# Patient Record
Sex: MALE | Race: BLACK OR AFRICAN AMERICAN
[De-identification: names, ages, dates, MRNs, and addresses within clinical notes are randomized per-mention and may not be internally consistent; named-entity substitution may affect disease eponyms.]

---

## 2018-01-29 ENCOUNTER — HOSPITAL ENCOUNTER (OUTPATIENT)
Dept: HOSPITAL 19 - COL.RAD | Age: 58
End: 2018-01-29
Attending: UROLOGY
Payer: COMMERCIAL

## 2018-01-29 DIAGNOSIS — M54.5: ICD-10-CM

## 2018-01-29 DIAGNOSIS — N28.89: ICD-10-CM

## 2018-01-29 DIAGNOSIS — Z87.442: ICD-10-CM

## 2018-01-29 DIAGNOSIS — N20.0: Primary | ICD-10-CM

## 2019-03-17 ENCOUNTER — HOSPITAL ENCOUNTER (EMERGENCY)
Dept: HOSPITAL 19 - COL.ER | Age: 59
Discharge: HOME | End: 2019-03-17
Payer: COMMERCIAL

## 2019-03-17 VITALS — TEMPERATURE: 98.4 F | HEART RATE: 75 BPM | SYSTOLIC BLOOD PRESSURE: 133 MMHG | DIASTOLIC BLOOD PRESSURE: 83 MMHG

## 2019-03-17 VITALS — HEIGHT: 70 IN | BODY MASS INDEX: 38.32 KG/M2 | WEIGHT: 267.64 LBS

## 2019-03-17 DIAGNOSIS — H16.002: Primary | ICD-10-CM

## 2019-03-17 DIAGNOSIS — Z79.82: ICD-10-CM

## 2019-11-08 ENCOUNTER — HOSPITAL ENCOUNTER (OUTPATIENT)
Dept: HOSPITAL 19 - SDCO | Age: 59
Discharge: HOME | End: 2019-11-08
Attending: SPECIALIST
Payer: COMMERCIAL

## 2019-11-08 VITALS — DIASTOLIC BLOOD PRESSURE: 65 MMHG | HEART RATE: 64 BPM | SYSTOLIC BLOOD PRESSURE: 88 MMHG | TEMPERATURE: 97 F

## 2019-11-08 VITALS — SYSTOLIC BLOOD PRESSURE: 90 MMHG | HEART RATE: 70 BPM | DIASTOLIC BLOOD PRESSURE: 66 MMHG

## 2019-11-08 VITALS — WEIGHT: 284.4 LBS | BODY MASS INDEX: 40.71 KG/M2 | HEIGHT: 70 IN

## 2019-11-08 VITALS — HEART RATE: 70 BPM | DIASTOLIC BLOOD PRESSURE: 66 MMHG | SYSTOLIC BLOOD PRESSURE: 95 MMHG

## 2019-11-08 VITALS — SYSTOLIC BLOOD PRESSURE: 90 MMHG | HEART RATE: 70 BPM | DIASTOLIC BLOOD PRESSURE: 67 MMHG

## 2019-11-08 VITALS — HEART RATE: 73 BPM | TEMPERATURE: 97.7 F | SYSTOLIC BLOOD PRESSURE: 121 MMHG | DIASTOLIC BLOOD PRESSURE: 81 MMHG

## 2019-11-08 DIAGNOSIS — G47.33: ICD-10-CM

## 2019-11-08 DIAGNOSIS — Z96.651: ICD-10-CM

## 2019-11-08 DIAGNOSIS — K22.8: ICD-10-CM

## 2019-11-08 DIAGNOSIS — K21.0: Primary | ICD-10-CM

## 2019-11-08 DIAGNOSIS — Z79.82: ICD-10-CM

## 2019-11-08 DIAGNOSIS — M19.90: ICD-10-CM

## 2019-11-08 DIAGNOSIS — I10: ICD-10-CM

## 2019-11-08 DIAGNOSIS — R15.9: ICD-10-CM

## 2019-11-08 DIAGNOSIS — E78.00: ICD-10-CM

## 2019-11-08 DIAGNOSIS — K64.1: ICD-10-CM

## 2019-11-08 DIAGNOSIS — K57.30: ICD-10-CM

## 2019-11-08 DIAGNOSIS — K92.1: ICD-10-CM

## 2019-11-08 NOTE — NUR
Pt sitting comfortably in chair with wife at bedside. Pt denies dizziness,
n/v. He is pink and cap refill less than 3 seconds. Pt is awake, alert, and
oriented. Per wife, Dr. Villaseñor came in to speak with her already about the
procedure while pt was in recovery post procedure. Wife relayed that
information to patient, and they have no further questions or concerns at this
time.

## 2019-11-08 NOTE — NUR
BP recheck. Pt continues to deny symptoms of decreased BP including dizziness,
lightheadedness, numbness/tingling. HR stable, cap refill less than 3 and
mucous membranes pink.

## 2019-11-08 NOTE — NUR
Reviewed discharge information with patient and wife including new medication
information (Omeprazole). Pt and wife had no further concerns/questions at
this time, and they expressed understanding of the plan. Pt states that he is
ready to go home and "eat a real meal." He denies pain, dizziness, nausea or
any symptoms of low BP. Pt meets criteria for discharge.

## 2019-11-08 NOTE — NUR
Pt arrived to room post procedure via cart with Endo RN.  Pt is alert, awake,
and oriented and easily ambulated to chair from cart.
Received report from
CHELSEY Tee, and she reported that pt experienced some low BP during procedure
and required some ephedrine to BP support. Pt's current BP is 90's over 60's,
but patient denies dizziness or lightheadedness, and his mucous membranes are
pink and cap refill is less than 3 seconds. Other vital signs are stable and
WNL on RA.
Apple juice and muffin brought to patient per his request. Pt denies pain or
nausea at this time.

## 2020-01-06 ENCOUNTER — HOSPITAL ENCOUNTER (OUTPATIENT)
Dept: HOSPITAL 19 - COL.RAD | Age: 60
End: 2020-01-06
Payer: OTHER GOVERNMENT

## 2020-01-06 DIAGNOSIS — M25.674: ICD-10-CM

## 2020-01-06 DIAGNOSIS — S99.921A: Primary | ICD-10-CM

## 2020-01-06 DIAGNOSIS — M72.2: ICD-10-CM

## 2020-11-19 ENCOUNTER — HOSPITAL ENCOUNTER (INPATIENT)
Dept: HOSPITAL 19 - JCC | Age: 60
LOS: 29 days | Discharge: HOME | DRG: 470 | End: 2020-12-18
Attending: ORTHOPAEDIC SURGERY | Admitting: ORTHOPAEDIC SURGERY
Payer: COMMERCIAL

## 2020-11-19 VITALS — WEIGHT: 264.78 LBS | BODY MASS INDEX: 37.91 KG/M2 | HEIGHT: 70 IN

## 2020-11-19 DIAGNOSIS — Z88.5: ICD-10-CM

## 2020-11-19 DIAGNOSIS — M17.12: Primary | ICD-10-CM

## 2020-11-19 DIAGNOSIS — R52: ICD-10-CM

## 2020-11-19 PROCEDURE — C1776 JOINT DEVICE (IMPLANTABLE): HCPCS

## 2020-11-19 PROCEDURE — A9284 NON-ELECTRONIC SPIROMETER: HCPCS

## 2020-12-16 VITALS — TEMPERATURE: 97.7 F | HEART RATE: 99 BPM | SYSTOLIC BLOOD PRESSURE: 116 MMHG | DIASTOLIC BLOOD PRESSURE: 74 MMHG

## 2020-12-16 VITALS — HEART RATE: 83 BPM | DIASTOLIC BLOOD PRESSURE: 58 MMHG | SYSTOLIC BLOOD PRESSURE: 113 MMHG

## 2020-12-16 VITALS — HEART RATE: 87 BPM | SYSTOLIC BLOOD PRESSURE: 109 MMHG | DIASTOLIC BLOOD PRESSURE: 64 MMHG

## 2020-12-16 VITALS — DIASTOLIC BLOOD PRESSURE: 82 MMHG | SYSTOLIC BLOOD PRESSURE: 142 MMHG | HEART RATE: 96 BPM

## 2020-12-16 VITALS — DIASTOLIC BLOOD PRESSURE: 69 MMHG | HEART RATE: 97 BPM | SYSTOLIC BLOOD PRESSURE: 116 MMHG | TEMPERATURE: 98.7 F

## 2020-12-16 VITALS — HEART RATE: 79 BPM | SYSTOLIC BLOOD PRESSURE: 119 MMHG | DIASTOLIC BLOOD PRESSURE: 76 MMHG | TEMPERATURE: 98.4 F

## 2020-12-16 VITALS — SYSTOLIC BLOOD PRESSURE: 106 MMHG | DIASTOLIC BLOOD PRESSURE: 64 MMHG | HEART RATE: 86 BPM

## 2020-12-16 VITALS — SYSTOLIC BLOOD PRESSURE: 105 MMHG | DIASTOLIC BLOOD PRESSURE: 60 MMHG | HEART RATE: 93 BPM

## 2020-12-16 VITALS — SYSTOLIC BLOOD PRESSURE: 110 MMHG | HEART RATE: 94 BPM | DIASTOLIC BLOOD PRESSURE: 72 MMHG

## 2020-12-16 VITALS — SYSTOLIC BLOOD PRESSURE: 108 MMHG | DIASTOLIC BLOOD PRESSURE: 82 MMHG | HEART RATE: 86 BPM

## 2020-12-16 VITALS — SYSTOLIC BLOOD PRESSURE: 91 MMHG | HEART RATE: 85 BPM | TEMPERATURE: 97.9 F | DIASTOLIC BLOOD PRESSURE: 53 MMHG

## 2020-12-16 VITALS — HEART RATE: 100 BPM | TEMPERATURE: 97.9 F | DIASTOLIC BLOOD PRESSURE: 71 MMHG | SYSTOLIC BLOOD PRESSURE: 111 MMHG

## 2020-12-16 PROCEDURE — 0SRD0J9 REPLACEMENT OF LEFT KNEE JOINT WITH SYNTHETIC SUBSTITUTE, CEMENTED, OPEN APPROACH: ICD-10-PCS | Performed by: ORTHOPAEDIC SURGERY

## 2020-12-16 NOTE — NUR
PATIENT DENIES PAIN. PATIENT REPORTING MORE SENSATION IN HIS LEFT LEG. POST-OP
VSS. PATIENT TOLERATING PO INTAKE WITHOUT DIFFICULTIES.

## 2020-12-16 NOTE — NUR
met with patient to discuss discharge planning. Patient lives in
[place] with his wife, Samantha (ph#[phone]). Patient sees Dr. Aguila at the [facility] and Dr. Carine Geller, both for primary care.
Patient [facility] most [facility] but also utilizes
[facility] in [place] as needed. Patient uses a CPAP at night and has a
walker at home. Patient is normally independent with ADLS and plans to return
home upon discharge. SW discussed Advance Directives with patient who was
interested in establishing DPOA-HC. Patient completed form and designated his
wife, Samantha and son, Steven. SW provided original and copies to patient
then placed copy in patient's chart. SW contacted patient's wife, Samantha to
review discharge plan. Samantha does not have any questions and concerns at
this time. SW will continue to follow as needed.

## 2020-12-16 NOTE — NUR
Arrived to surgical floor. Assessment complete and WNL. IV started in right
forearm 20g. Left knee surgical site scrubbed and marked. Consent signed and
on chart. All questions answered at this time. TORY applied to non surgical
limb. Call light in reach.

## 2020-12-16 NOTE — NUR
CNA ENTERED ROOM AND REPORTED TO THIS NURSE THAT THE PATIENT IS TEARFUL AND
APPEARS TO BE IN SEVERE PAIN. UPON ENTRY TO THE ROOM THE PATIENT IS TENSE,
SHIFTING IN BED, AND MOANING IN PAIN. PATIENT REPORTS THAT THE PAIN SPRUNG UP
OUT OF NOWHERE. PO NORCO AND IV MORPHINE GIVEN AT THIS TIME. PATIENT GIVEN
SNACK WITH MEDICATION. ICE PACK REMOVED FROM KNEE AND REPLACED WITH CRYOCUFF.
LEG REPOSITIONED ON PILLOWS. CALL LIGHT IN REACH.

## 2020-12-16 NOTE — NUR
Returned to bed from chair. Ambulated in room. Assessment complete. Lungs
clear. Heart sounds normal. Bowels active x4. Pulses present throughout. No
edema noted. INT right forearm flushed without complications. Left knee has
bulky ace wrap dressing in place. CDI. CMS intact. Reports pain 8/10 with
nausea. Given PRN zofran and oxycodone at this time. Denies other needs. call
light in reach.

## 2020-12-16 NOTE — NUR
PATIENT TOLERATING CLEAR LIQUIDS WITHOUT ANY DIFFICULTIES. DIET ADVANCED TO
GENERAL PER ORDERS. BP IMPROVING. POST-OP VSS.

## 2020-12-16 NOTE — NUR
POST-OP VSS AND COMPLETE. DRESSING REMAINS CD&I. PAIN WELL CONTROLLED AT THIS
TIME. WILL CONTINUE TO MONITOR.

## 2020-12-16 NOTE — NUR
PATIENT ARRIVED TO ROOM 328 POST-OP VIA BED. PATIENT IS AWAKE, AND A&O.
POST-OP BP SOFT, IV FLUIDS RUNNING. OTHERWISE VITALS STABLE. SHIFT ASSESSMENT
COMPLETED. POSITIVE PEDAL PULSES EQUAL BILATERALLY. CAP REFILL <3 SECONDS. CMS
INTACT. TORY HOSE AND SCD TO RLE. EDEMA TO LEFT KNEE NOTED. LEFT KNEE ACE WRAP
DRESSING IS CD&I. LLE ELEVATED ON A PILLOW. ICE PACK IN PLACE TO KNEE.
PATIENT DENIES COMPLAINTS OF PAIN AT THIS TIME. CALL LIGHT WITHIN REACH.

## 2020-12-16 NOTE — NUR
UPON ENTRY TO THE ROOM THE PATIENT APPEARS TO BE IN SEVER DISCOMFORT AGAIN.
PATIENT REPORTS HIS PAIN AS A 10/10. PATIENT IS DIAPHORETIC AND TEARFUL.
PATIENT EDUCATED ON CALLING FOR PAIN MEDICATIONS IF THE PAIN SPIKES. PRN MAXIMO
AND MORPHINE GIVEN. WILL CONTINUE TO MONITOR.

## 2020-12-17 VITALS — TEMPERATURE: 97.9 F | HEART RATE: 95 BPM | SYSTOLIC BLOOD PRESSURE: 123 MMHG | DIASTOLIC BLOOD PRESSURE: 77 MMHG

## 2020-12-17 VITALS — SYSTOLIC BLOOD PRESSURE: 121 MMHG | HEART RATE: 90 BPM | DIASTOLIC BLOOD PRESSURE: 76 MMHG | TEMPERATURE: 98 F

## 2020-12-17 VITALS — TEMPERATURE: 98.2 F | DIASTOLIC BLOOD PRESSURE: 64 MMHG | SYSTOLIC BLOOD PRESSURE: 88 MMHG | HEART RATE: 92 BPM

## 2020-12-17 VITALS — HEART RATE: 94 BPM | SYSTOLIC BLOOD PRESSURE: 118 MMHG | DIASTOLIC BLOOD PRESSURE: 71 MMHG | TEMPERATURE: 98.6 F

## 2020-12-17 VITALS — SYSTOLIC BLOOD PRESSURE: 128 MMHG | HEART RATE: 99 BPM | DIASTOLIC BLOOD PRESSURE: 84 MMHG | TEMPERATURE: 98.3 F

## 2020-12-17 VITALS — SYSTOLIC BLOOD PRESSURE: 114 MMHG | DIASTOLIC BLOOD PRESSURE: 73 MMHG | HEART RATE: 95 BPM | TEMPERATURE: 98.1 F

## 2020-12-17 NOTE — NUR
Reports 6/10 knee pain. Provided with PRN oxycodone at patient request. Denies
other needs. Call light in reach.

## 2020-12-17 NOTE — NUR
Patient required x1 dose of zofran for nausea and oxycodone for pain control
throughout night. Otherwise uneventful night. Resting in bed this AM. Call
light in reach.

## 2020-12-17 NOTE — NUR
Patient in bed resting, alert and oriented x 3. Assessment complete. Cryocuff
to left knee. Aquacell to left knee is CDI. Pedal pulses intact. Patient
states pain is tolerable at this time. LLE elevated on pillows. CPAP at
bedside. Denies further needs at this time.

## 2020-12-18 VITALS — HEART RATE: 97 BPM | DIASTOLIC BLOOD PRESSURE: 72 MMHG | TEMPERATURE: 98.9 F | SYSTOLIC BLOOD PRESSURE: 113 MMHG

## 2020-12-18 VITALS — SYSTOLIC BLOOD PRESSURE: 124 MMHG | TEMPERATURE: 98.3 F | HEART RATE: 99 BPM | DIASTOLIC BLOOD PRESSURE: 81 MMHG

## 2020-12-18 VITALS — DIASTOLIC BLOOD PRESSURE: 52 MMHG | SYSTOLIC BLOOD PRESSURE: 100 MMHG | HEART RATE: 99 BPM | TEMPERATURE: 97.7 F

## 2020-12-18 NOTE — NUR
PT UP TO BR AFTER BREAKFAST. PAIN CONTOLLED WITH PO MEDS. MUCH IMPROVED.
DRESSING TO LEFT KNEE CDI WITH AQUACEL OVER INCISION.

## 2020-12-18 NOTE — NUR
Patient has done well throughout the night, cpap on through the night.
Cryocuff to left knee. Patient has requested pain medication approximately
every 4 hours, medications given per orders. Denies further needs at this
time. Will report off to day shift.

## 2020-12-18 NOTE — NUR
Patient called out to nurses station states pain to left knee 7/10,
medications were given per orders, denies further needs at this time.

## 2021-02-23 ENCOUNTER — HOSPITAL ENCOUNTER (OUTPATIENT)
Dept: HOSPITAL 19 - COL.RAD | Age: 61
End: 2021-02-23
Attending: UROLOGY
Payer: COMMERCIAL

## 2021-02-23 VITALS — BODY MASS INDEX: 36.3 KG/M2 | WEIGHT: 253.53 LBS | HEIGHT: 70 IN

## 2021-02-23 VITALS — DIASTOLIC BLOOD PRESSURE: 92 MMHG | HEART RATE: 74 BPM | SYSTOLIC BLOOD PRESSURE: 138 MMHG

## 2021-02-23 DIAGNOSIS — N28.1: Primary | ICD-10-CM

## 2021-04-27 ENCOUNTER — HOSPITAL ENCOUNTER (OUTPATIENT)
Dept: HOSPITAL 19 - SDCO | Age: 61
LOS: 1 days | Discharge: HOME | End: 2021-04-28
Attending: UROLOGY
Payer: COMMERCIAL

## 2021-04-27 VITALS — SYSTOLIC BLOOD PRESSURE: 129 MMHG | DIASTOLIC BLOOD PRESSURE: 84 MMHG | HEART RATE: 92 BPM

## 2021-04-27 VITALS — HEART RATE: 82 BPM | DIASTOLIC BLOOD PRESSURE: 72 MMHG | SYSTOLIC BLOOD PRESSURE: 122 MMHG

## 2021-04-27 VITALS — HEART RATE: 86 BPM | DIASTOLIC BLOOD PRESSURE: 75 MMHG | SYSTOLIC BLOOD PRESSURE: 127 MMHG

## 2021-04-27 VITALS — TEMPERATURE: 98.3 F | HEART RATE: 75 BPM | SYSTOLIC BLOOD PRESSURE: 144 MMHG | DIASTOLIC BLOOD PRESSURE: 86 MMHG

## 2021-04-27 VITALS — DIASTOLIC BLOOD PRESSURE: 87 MMHG | HEART RATE: 69 BPM | SYSTOLIC BLOOD PRESSURE: 131 MMHG | TEMPERATURE: 97.4 F

## 2021-04-27 VITALS — HEART RATE: 88 BPM | SYSTOLIC BLOOD PRESSURE: 127 MMHG | DIASTOLIC BLOOD PRESSURE: 75 MMHG

## 2021-04-27 VITALS — DIASTOLIC BLOOD PRESSURE: 76 MMHG | SYSTOLIC BLOOD PRESSURE: 123 MMHG | HEART RATE: 87 BPM

## 2021-04-27 VITALS — HEIGHT: 70 IN | BODY MASS INDEX: 36.58 KG/M2 | WEIGHT: 255.52 LBS

## 2021-04-27 VITALS — HEART RATE: 80 BPM | SYSTOLIC BLOOD PRESSURE: 121 MMHG | DIASTOLIC BLOOD PRESSURE: 72 MMHG

## 2021-04-27 VITALS — SYSTOLIC BLOOD PRESSURE: 125 MMHG | DIASTOLIC BLOOD PRESSURE: 79 MMHG | HEART RATE: 87 BPM

## 2021-04-27 VITALS — TEMPERATURE: 97.9 F | DIASTOLIC BLOOD PRESSURE: 74 MMHG | HEART RATE: 80 BPM | SYSTOLIC BLOOD PRESSURE: 114 MMHG

## 2021-04-27 DIAGNOSIS — G47.33: ICD-10-CM

## 2021-04-27 DIAGNOSIS — E11.9: ICD-10-CM

## 2021-04-27 DIAGNOSIS — Z79.82: ICD-10-CM

## 2021-04-27 DIAGNOSIS — M48.00: ICD-10-CM

## 2021-04-27 DIAGNOSIS — N43.0: ICD-10-CM

## 2021-04-27 DIAGNOSIS — E78.5: ICD-10-CM

## 2021-04-27 DIAGNOSIS — Z80.42: ICD-10-CM

## 2021-04-27 DIAGNOSIS — R39.15: ICD-10-CM

## 2021-04-27 DIAGNOSIS — Z79.899: ICD-10-CM

## 2021-04-27 DIAGNOSIS — R35.0: ICD-10-CM

## 2021-04-27 DIAGNOSIS — M10.9: ICD-10-CM

## 2021-04-27 DIAGNOSIS — N28.1: Primary | ICD-10-CM

## 2021-04-27 DIAGNOSIS — Z20.822: ICD-10-CM

## 2021-04-27 DIAGNOSIS — M19.90: ICD-10-CM

## 2021-04-27 DIAGNOSIS — Z88.5: ICD-10-CM

## 2021-04-27 DIAGNOSIS — I10: ICD-10-CM

## 2021-04-27 DIAGNOSIS — Z99.89: ICD-10-CM

## 2021-04-27 LAB
ANION GAP SERPL CALC-SCNC: 7 MMOL/L (ref 7–16)
BASOPHILS # BLD: 0 10*3/UL (ref 0–0.2)
BASOPHILS NFR BLD AUTO: 0.5 % (ref 0–2)
BUN SERPL-MCNC: 16 MG/DL (ref 9–20)
CALCIUM SERPL-MCNC: 9.2 MG/DL (ref 8.4–10.2)
CHLORIDE SERPL-SCNC: 107 MMOL/L (ref 98–107)
CO2 SERPL-SCNC: 27 MMOL/L (ref 22–30)
CREAT SERPL-SCNC: 1.03 UMOL/L (ref 0.66–1.25)
EOSINOPHIL # BLD: 0.1 10*3/UL (ref 0–0.7)
EOSINOPHIL NFR BLD: 0.9 % (ref 0–4)
ERYTHROCYTE [DISTWIDTH] IN BLOOD BY AUTOMATED COUNT: 14 % (ref 11.5–14.5)
GLUCOSE SERPL-MCNC: 93 MG/DL (ref 74–106)
GRANULOCYTES # BLD AUTO: 67 % (ref 42.2–75.2)
HCT VFR BLD AUTO: 45.4 % (ref 42–52)
HGB BLD-MCNC: 15 G/DL (ref 13.5–18)
LYMPHOCYTES # BLD: 1.8 10*3/UL (ref 1.2–3.4)
LYMPHOCYTES NFR BLD: 23.7 % (ref 20–51)
MCH RBC QN AUTO: 31 PG (ref 27–31)
MCHC RBC AUTO-ENTMCNC: 33 G/DL (ref 33–37)
MCV RBC AUTO: 95 FL (ref 80–100)
MONOCYTES # BLD: 0.6 10*3/UL (ref 0.1–0.6)
MONOCYTES NFR BLD AUTO: 7.4 % (ref 1.7–9.3)
NEUTROPHILS # BLD: 5 10*3/UL (ref 1.4–6.5)
PLATELET # BLD AUTO: 231 K/MM3 (ref 130–400)
PMV BLD AUTO: 9.8 FL (ref 7.4–10.4)
POTASSIUM SERPL-SCNC: 3.7 MMOL/L (ref 3.4–5)
RBC # BLD AUTO: 4.79 M/MM3 (ref 4.2–5.6)
SODIUM SERPL-SCNC: 142 MMOL/L (ref 137–145)

## 2021-04-27 PROCEDURE — A9284 NON-ELECTRONIC SPIROMETER: HCPCS

## 2021-04-27 NOTE — NUR
Pt. sitting up in bed. Pt. is a&OX3, assessment complete. IV to lt. hand
patent, IV fluids infusing per orders.  Pt. reports pain at a 6 on pain scale,
gave pain meds per orders.  Pt. denies further needs, call light within reach.

## 2021-04-27 NOTE — NUR
ADMITED INTO ROOM 348 POST OP. PATIENT IS DROWSY AND MOANS WITH MOVEMENT AND
THEN FALLS BACK TO SLEEP. PACU REPORTED GIVING PAIN MEDS AND PATIENT'S OXYGEN
NEEDS INCREASED. 02 @ 3L PER NC WITH SAT IN LOW 90'S. PATIENT HAS HX OF APNEA
AND WEARS A C-PAP AT HS. ALL OTHER VSS. IV FLUIDS INFUSING INTO LEFT HAND IV.
NO C/O N/V. PATIENT IS OBESE WITH ROUND ABD. BOWL SOUNDS PRESENT. NOTED LAP
STIES X6 ARE CD&I WITH GLUED CLOSURE. HEAD TO TOE ASSESSMENT COMPLETE. WIFE AT
BEDSIDE. CALL LIGHT IN REACH.

## 2021-04-28 VITALS — TEMPERATURE: 98.3 F | HEART RATE: 71 BPM | SYSTOLIC BLOOD PRESSURE: 97 MMHG | DIASTOLIC BLOOD PRESSURE: 67 MMHG

## 2021-04-28 VITALS — SYSTOLIC BLOOD PRESSURE: 118 MMHG | DIASTOLIC BLOOD PRESSURE: 82 MMHG | HEART RATE: 68 BPM | TEMPERATURE: 97.6 F

## 2021-04-28 LAB
ANION GAP SERPL CALC-SCNC: 8 MMOL/L (ref 7–16)
BUN SERPL-MCNC: 20 MG/DL (ref 9–20)
CALCIUM SERPL-MCNC: 9.4 MG/DL (ref 8.4–10.2)
CHLORIDE SERPL-SCNC: 102 MMOL/L (ref 98–107)
CO2 SERPL-SCNC: 26 MMOL/L (ref 22–30)
CREAT SERPL-SCNC: 1.09 UMOL/L (ref 0.66–1.25)
GLUCOSE SERPL-MCNC: 162 MG/DL (ref 74–106)
POTASSIUM SERPL-SCNC: 4.5 MMOL/L (ref 3.4–5)
SODIUM SERPL-SCNC: 137 MMOL/L (ref 137–145)

## 2021-04-28 NOTE — NUR
Discharge education provided to patient. Educated on when to call provider and
signs and symptoms of infection. Patient and spouse educated on follow up
appointment. Patient educated on all new medications and medication safety.
All questions answered. INT to left hand discontinued, catheter tip intact.
Denies further needs at this time.

## 2021-04-28 NOTE — NUR
Patient sitting up in recliner eating breakfast. Alert and oriented x 3.
Assessment complete. Denies pain at this time. Lap sited with edges well
approximated, site to RLQ has minimal drainage; gauze and tape dressing in
place. Patient up independently in room. Denies further needs at this time.

## 2022-01-24 ENCOUNTER — HOSPITAL ENCOUNTER (EMERGENCY)
Dept: HOSPITAL 19 - COL.ER | Age: 62
Discharge: HOME | End: 2022-01-24
Attending: STUDENT IN AN ORGANIZED HEALTH CARE EDUCATION/TRAINING PROGRAM
Payer: COMMERCIAL

## 2022-01-24 VITALS — DIASTOLIC BLOOD PRESSURE: 79 MMHG | TEMPERATURE: 98.4 F | HEART RATE: 80 BPM | SYSTOLIC BLOOD PRESSURE: 128 MMHG

## 2022-01-24 VITALS — BODY MASS INDEX: 38.73 KG/M2 | HEIGHT: 70.98 IN | WEIGHT: 276.68 LBS

## 2022-01-24 DIAGNOSIS — Z87.39: ICD-10-CM

## 2022-01-24 DIAGNOSIS — M54.50: Primary | ICD-10-CM

## 2022-01-24 DIAGNOSIS — Z88.5: ICD-10-CM

## 2022-05-02 ENCOUNTER — HOSPITAL ENCOUNTER (OUTPATIENT)
Dept: HOSPITAL 19 - DIA.ED | Age: 62
End: 2022-05-02
Attending: INTERNAL MEDICINE
Payer: COMMERCIAL

## 2022-05-02 DIAGNOSIS — I10: ICD-10-CM

## 2022-05-02 DIAGNOSIS — Z79.84: ICD-10-CM

## 2022-05-02 DIAGNOSIS — E11.9: Primary | ICD-10-CM

## 2022-05-02 PROCEDURE — G0108 DIAB MANAGE TRN  PER INDIV: HCPCS

## 2022-06-16 ENCOUNTER — HOSPITAL ENCOUNTER (OUTPATIENT)
Dept: HOSPITAL 19 - DIA.ED | Age: 62
End: 2022-06-16
Attending: INTERNAL MEDICINE
Payer: COMMERCIAL

## 2022-06-16 DIAGNOSIS — E11.65: Primary | ICD-10-CM

## 2022-06-16 DIAGNOSIS — Z79.84: ICD-10-CM

## 2022-06-16 DIAGNOSIS — I10: ICD-10-CM

## 2022-06-16 PROCEDURE — G0108 DIAB MANAGE TRN  PER INDIV: HCPCS
